# Patient Record
(demographics unavailable — no encounter records)

---

## 2024-11-07 NOTE — HISTORY OF PRESENT ILLNESS
[FreeTextEntry1] : HALEY BASILIO is a 70 year old male with a past medical history of f HTN, HL, ED, mild-moderate MR, AF s/p AF ablation with Dr. Pittman at [facility], [facility] Medtronic dual-chamber PPM 2019.  atient moved locally and wishes to establish cardiology follow-up.  No history of CAD, MI, revascularization, CHF, TIA, CVA, diabetes, PVD, DVT, PE.  Patient is exercising on elliptical machine 15 to 30 minutes without exertional symptoms. Patient does not wish to have a current stress test.  Pacemaker Paceart check April 2024 battery 2.99, no events  Last seen 7/16/24. Here to review echo, carotid, and abd u/s. See details below. Routine pacemaker remote monitoring with [facility] --> transmission 10/2024, all measured data within normal limits. No events for review. In the interim there have been no hospitalizations or procedures. There is minor lightheadedness with changes in position. No syncope. He denies chest pain, pressure, palpitations, unusual shortness of breath, orthopnea, LE edema, or syncope. Cigar smoker. Active with walking and spinning without exertional complaints.  He declined vital signs in the office today. States BPs at home 120's over 80's. Discussed the importance of monitoring.  Testing:  Echo 8/21/24: CONCLUSIONS: 1. Abnormal septal motion consistent with right ventricular pacemaker. Left ventricular systolic function is  normal with an ejection fraction visually estimated at 55 to 60 %. 2. Normal left ventricular diastolic function. 3. Mild left ventricular hypertrophy. 4. Left atrium is mildly dilated. 5. The right atrium is mildly dilated. 6. Aortic root at the sinuses of Valsalva is dilated, measuring 4.20 cm (indexed 2.01 cm/m). Ascending  aorta diameter is dilated, measuring 4.20 cm (indexed 2.01 cm/m). 7. Mild to moderate eccentric aortic regurgitation. Mild calcification of the aortic valve leaflets. 8. Moderate mitral regurgitation. Normal pulmonary venous flow. Calcification of the mitral valve annulus. 9. Mild to moderate tricuspid regurgitation. 10. Trace pulmonic regurgitation. 11. Estimated pulmonary artery systolic pressure is 35 mmHg, consistent with mild pulmonary  hypertension. 12. Device lead is visualized in the right heart. 13. No pericardial effusion seen. 14. Compared to the transthoracic echocardiogram performed on 4/24/2023, increase in TR and PASP.  slight increase in Ao root (prior 4.0 cm)  Carotid u/s 8/2024: CONCLUSIONS: 1. Mild non-obstructive disease seen bilaterally. 2. Vertebral arteries: antegrade flow bilaterally. 3. No prior exam is available for comparison.  Abd u/s 8/2024: CONCLUSIONS: 1. Borderline dilation noted in the proximal abdominal aorta (2.86 cm x 2.86 cm). 2. Mild homogeneous atherosclerosis seen throughout the abominal aorta. 3. No prior exam is available for comparison.  Labs 7/2024: Trigs 348, Chol 249, HDL 52, , PSA 1.67, Na 141, K 4.7, Cr 0.85, Ca 10.1, AST 25, ALT 25, TSH 1.32, WBC 7.81, Hgb 15.1, HCT 48.7, plt 214, A1C 5.2.  CT heart 2013: Measurement of pulmonary veins pre op ablation. Dilated aortic root. Otherwise normal scan. Quality 110: Preventive Care And Screening: Influenza Immunization: Influenza Immunization previously received during influenza season Quality 226: Preventive Care And Screening: Tobacco Use: Screening And Cessation Intervention: Tobacco Screening not Performed for Medical Reasons Detail Level: Detailed Quality 130: Documentation Of Current Medications In The Medical Record: Current Medications Documented

## 2024-11-07 NOTE — DISCUSSION/SUMMARY
[FreeTextEntry1] : HALEY BASILIO is a 70 year old M who presents today Nov 07, 2024 with the above history and the following active issues:  Patient presents today to review testing as noted above and has no significant complaints. Statin therapy recommended. Patient declines. Will utilize dietary measures and repeat in 6 months. Labs ordered.  Cont to monitor BP. Trend BPs at home. Patient states his BP cuff has been checked and is accurate. Educated patient on low salt diet, alcohol intake in moderation, regular cardiovascular exercise, and weight reduction for improved BP control. Continue to monitor BP at home and call for persistently elevated readings (>140/90).   - Medtronic 2-lead PPM implant 2019,remote monitoring with [facility]. Offered to change it to ESC. He states he will keep it for now. Office pacemaker check will be scheduled same time as next OV.  - Remote history of AF s/p ablation currently on aspirin and Toprol  - Hypertension, average resting BPs at guideline goal on Toprol and amlodipine. Discussed moderate exercise, weight loss, low salt diet, avoidance of alcohol and caffeine.  - Hyperlipidemia , triglyceride 348 patient declines statin, modify diet, start fish oil, repeat labs 6 months  - Mild-moderate MR VHD, surveillance montioring.  Dilated aorta: BP control and surveillance monitoring.   Ongoing f/u with PCP.  F/U: Declines 6 month OV, will come in 7/2024 and have DVC same day. Discussed red flag symptoms, which would warrant sooner or emergent medical evaluation. Any questions and concerns were addressed and resolved.  Sincerely, Nupur Valdez [facility] Patient's history, testing, and plan was reviewed with supervising physician, Dr. Patrick Valentino

## 2025-02-10 NOTE — PLAN
[FreeTextEntry1] : 71-year-old gentleman presents for preoperative medical clearance Scheduled to undergo the first of 2 cataract surgeries early next month He has never had an issue with anesthesia before History of pacemaker and atrial flutter have resolved with ablation Hyperlipidemia-review of lab work shows significantly elevated lipid studies.  He follows with cardiology and has not been placed on any lipid at this time It is suggested that he undergo fasting blood work upon return Forms were completed medical clearance is provided This 71-year-old gentleman is medically cleared for surgical intervention

## 2025-02-10 NOTE — HEALTH RISK ASSESSMENT
[0] : 2) Feeling down, depressed, or hopeless: Not at all (0) [PHQ-2 Negative - No further assessment needed] : PHQ-2 Negative - No further assessment needed [[id number]] : 0

## 2025-05-08 NOTE — PROCEDURE
[Complete Heart Block] : complete heart block [See Device Printout] : See device printout [Pacemaker] : pacemaker [DDD] : DDD [Voltage: ___ volts] : Voltage was [unfilled] volts [Threshold Testing Performed] : Threshold testing was performed [Lead Imp:  ___ohms] : lead impedance was [unfilled] ohms [Sensing Amplitude ___mv] : sensing amplitude was [unfilled] mv [___V @] : [unfilled] V [___ ms] : [unfilled] ms [None] : none [Counters Reset] : the counters were reset [de-identified] : Medtronic  [de-identified] :  [de-identified] : 6.9 years. [de-identified] : AP 26.9%  100% AT/AF <0.1%   Episode AFib started 5/6/25 10:24pm. Compliant with Xarelto (started yesterday 5/7/25), and Toprol XL. Nearly asymptomatic, some SOB with exertion, mild fatigue. No palps. No clear s/s when going into AFib.   F/u Echo, OV with EP team to discuss ablation. F/u with Dr. Valentino. Limitations of non-invasive testing reviewed. Red flag symptoms which would warrant sooner emergent evaluation reviewed with the patient. All questions and concerns were addressed and answered.   Sincerely,  Sarahi Morales, [facility] Patient's history, testing, medications and any relative changes in plan of care reviewed with supervising MD: Dr. Mike Solis

## 2025-05-08 NOTE — PHYSICAL EXAM
[Well Developed] : well developed [No Acute Distress] : no acute distress [No Carotid Bruit] : no carotid bruit [No Murmur] : no murmur [Clear Lung Fields] : clear lung fields [No Respiratory Distress] : no respiratory distress  [Normal Gait] : normal gait [No Edema] : no edema [Normal Radial B/L] : normal radial B/L [Moves all extremities] : moves all extremities [No Focal Deficits] : no focal deficits [Normal Speech] : normal speech [Alert and Oriented] : alert and oriented [de-identified] : irregular.

## 2025-05-08 NOTE — CARDIOLOGY SUMMARY
[de-identified] : 8/2024 LVEF 55-60%, mild LVH, LA mildly dilated, Aortic Root 4.2cm, Asc Ao 4.2cm, mod MR, mild-mod TR, PASP 35mmHg.  4/2023 LVEF 65%, mild LVH, severely dilated LA, mild-mod MR, mild TR, mild Aortic Root dilation (4cm), PASP 29mmHg.  [de-identified] : 8/2024 Carotid US: Mild non-obstructive disease seen bilaterally. 8/2024 Abd US: Borderline dilation noted in the proximal abdominal aorta (2.86 cm x 2.86 cm). Mild plaque.

## 2025-05-08 NOTE — HISTORY OF PRESENT ILLNESS
FINAL REPORT [FreeTextEntry1] : Dmitriy is a 71yoM with PMHx: AFib s/p Ablation with Dr. Pittman ([facility]), s/p PPM (2019), HTN, HLD, VHD (moderate MR), dilated Aortic Root/Asc Ao, ED.   He was last seen 11/2024 feeling well, routine exercise. No active cardiac sx.   Remote team alerted our office for AFib episode on 5/6/25, rate controlled. CHADSVASC 2. Started on Xarelto 20mg daily.    He presents in clinic today for DVC and follow up.  He reports at time of episode on 5/6/25 he had sx of fatigue and SOB. Sx have since resolved. Feeling well, no palps, no SOB, no fatigue, no chest discomfort.  He is compliant with Toprol XL. First dose Xarelto was taken last night, 5/7/25 PM.  In office DVC today showed persistent AFib, rate controlled, no further episodes for review. Device is functioning normally. See note.   He is very active. Caretakes his home, formal exercise routine on elliptical 15-30mins daily.   There is no exertional chest pain, pressure or discomfort. There is no significant dyspnea on exertion or shortness of breath. There is no LE edema, PND or orthopnea. There are no symptomatic palpitations, lightheadedness, dizziness or syncope.   BP is well controlled today at 116/66. Weight is stable. He has lost 7lbs since 2/2025.   Review of prior testing included in cardiology summary.

## 2025-05-08 NOTE — DISCUSSION/SUMMARY
[FreeTextEntry1] : HALEY BASILIO is a 71 year old M who presents today May 08, 2025 with the above history and the following active issues:   Recurrent AFib, s/p ablation, s/p Dual Chamber PPM: Currently in AFib, rate controlled. On Toprol XL and Xarelto. High risk medication with no s/s of AEs. Frequent wine drinker, multiple glasses nightly. He is concerned this may have put him back in AFib. Discussed pathophysiology.  Recommend Echocardiogram for evaluation of cardiac anatomy and function including resting heart structure, valvular function, and LVEF. Pt had sx of fatigue and dyspnea with AFib onset. Evaluate LVEF.  Recommend f/u with EP team to discuss re-ablating AFib.   HTN: Well controlled on Amlodipine and Toprol XL.   HLD: Elevated Trigs and LDL. Pt declines statins. Recommend updating fasting lab work and rediscuss pending results of cholesterol panel.  Dietary changes to reduce saturated fat intake and other measures to reduce cholesterol have been discussed. Patient was educated on low sodium diet and avoidance of excessive alcohol. Recommended that patient try and follow active lifestyle with regular cardiovascular exercise.   Dilated aortic root/Asc Ao: Echo 8/2024 with Aortic Root 4.2cm, Asc Ao 4.2cm. F/u echo pending. Ongoing surveillance monitoring.   VHD: Moderate MR, mild-mod TR. As above, f/u echo pending, ongoing surveillance monitoring.   F/u lab work, Echo, EP visit and routine f/u with PV. He will make the office aware of any new or worsening sx.  Risks benefits of Xarelto reviewed at length.  Red flag symptoms which would warrant sooner emergent evaluation reviewed with the patient. All questions and concerns were addressed and answered.   Sincerely,  Sarahi Morales, [facility] Patient's history, testing, medications and any relative changes in plan of care reviewed with supervising MD: Dr. Mike Solis

## 2025-05-08 NOTE — REASON FOR VISIT
[New Patient Device Check] : is here today for a new patient device check visit for [Arrhythmias (seen on stored data)] : arrhythmias seen on stored data [Spouse] : spouse

## 2025-05-23 NOTE — REVIEW OF SYSTEMS
[Feeling Fatigued] : feeling fatigued [Dyspnea on exertion] : dyspnea during exertion [Chest Discomfort] : no chest discomfort [Palpitations] : no palpitations [Dizziness] : no dizziness [Easy Bleeding] : no tendency for easy bleeding

## 2025-05-23 NOTE — DISCUSSION/SUMMARY
[FreeTextEntry1] : The patient has recurrent A-fib.  He may have symptoms from it which includes mild shortness of breath with exertion as well as fatigue.  The duration of the A-fib seems to be but 2 weeks.  He is mostly ventricular paced.  His left ventricular function on the recent echo was normal.  My recommendation is for us to restore sinus rhythm and see how he feels.  Options were discussed with the patient including cardioversion, antiarrhythmic and ablation procedure.  Recommended to the patient that he consider a cardioversion first to see if he has improvement in symptoms and based on this we would then proceed with an ablation.  He is not too keen on more medications.  Will arrange for cardioversion at [facility] [place].  He gets an ablation will be done at [place].  The patient has not had any recent stress test or coronary assessment and would like to have this done as well.  Prior to the ablation procedure I will obtain a coronary CT which includes the pulmonary veins.  This been useful to assess the pulmonary veins prior to the AF ablation given he had a prior AF ablation  The patient is going on a [place] cruise shortly and his preference would be to do it when he returns.  The duration of the cruise is 10 days.

## 2025-05-23 NOTE — HISTORY OF PRESENT ILLNESS
[FreeTextEntry1] : Patient is a 71-year-old man who is seen in evaluation regarding his atrial fibrillation. Patient is a retired . He underwent catheter ablation for atrial fibrillation in 2014 by Dr. Pako Pittman.  He has also had history of complete AV block and underwent the permanent pacemaker implant November 2019 by Dr. Brooks.  Patient has been doing well until recently when he has noted some increasing shortness of breath.  Also on his device remote transmissions he was noted to be in atrial fibrillation.  He has been on anticoagulation with Xarelto that was started on 5/7/2025.  He has also been on Toprol-XL.  His symptoms currently is some shortness of breath with exertion.  He also had symptoms of mild fatigue.  The pacemaker was interrogated and it shows that he is 100% ventricularly paced.  It is in the DDD mode with a lower rate of 60 upper rate of 130.  And he has been in atrial fibrillation for at least the last 2 weeks.  Pacing sensing lead impedances in the RV lead were normal.  Sensing and lead impedances atrial lead were normal.  He had an echocardiogram performed on May 9, 2025 that showed an EF of 60 to 65%, the left atrium was mildly dilated and the right atrium is mildly dilated.  There is mild left ventricular hypertrophy and mild to moderate MR with mild to moderate TR.  He has a prior history of hypertension that is controlled.  No history of diabetes.  No prior history of stroke or TIA.  No previous history of MI.  No history of thyroid disease or GI bleed.

## 2025-07-01 NOTE — HISTORY OF PRESENT ILLNESS
[FreeTextEntry1] : Patient is status post electrical cardioversion for atrial fibrillation.  He went on a [place] cruise he felt better.  He was returned and interrogation of the pacemaker today shows that he is in sinus rhythm.  His previous shortness of breath as well as mild fatigue is somewhat better. He underwent catheter ablation for atrial fibrillation in 2014 by Dr. Antonio Pittman.  He has also had history of complete AV block and underwent the permanent pacemaker implant November 2019 by Dr. Brooks.  Patient has been doing well until recently when he has noted some increasing shortness of breath.  Also on his device remote transmissions he was noted to be in atrial fibrillation.  He has been on anticoagulation with Xarelto that was started on 5/7/2025.  He has also been on Toprol-XL.   The pacemaker was interrogated and it shows that he is 100% ventricularly paced.  It is in the DDD mode with a lower rate of 60 upper rate of 130.  And he has been in atrial fibrillation for at least the last 2 weeks.  Pacing sensing lead impedances in the RV lead were normal.  Sensing and lead impedances atrial lead were normal.  He had an echocardiogram performed on May 9, 2025 that showed an EF of 60 to 65%, the left atrium was mildly dilated and the right atrium is mildly dilated.  There is mild left ventricular hypertrophy and mild to moderate MR with mild to moderate TR.  He has a prior history of hypertension that is controlled.  No history of diabetes.  No prior history of stroke or TIA.  No previous history of MI.  No history of thyroid disease or GI bleed.

## 2025-07-01 NOTE — DISCUSSION/SUMMARY
[FreeTextEntry1] : He has remained in sinus rhythm with improvement in symptoms.  He had a CT of the left atrium and the pulmonary veins appear to be of normal dimensions and there was no evidence of left atrial appendage thrombus.  He is atrially paced approximately 51% and ventricular paced 100%.  Will monitor him and should he have recurrent A-fib we would not proceed with a catheter ablation procedure.  He will continue on Xarelto as well as metoprolol.  The patient will get remote monitoring but will also return to the office in 3 months to rediscuss his AF burden.